# Patient Record
Sex: FEMALE | Race: WHITE | NOT HISPANIC OR LATINO | Employment: OTHER | URBAN - NONMETROPOLITAN AREA
[De-identification: names, ages, dates, MRNs, and addresses within clinical notes are randomized per-mention and may not be internally consistent; named-entity substitution may affect disease eponyms.]

---

## 2017-01-24 ENCOUNTER — OFFICE VISIT (OUTPATIENT)
Dept: FAMILY MEDICINE CLINIC | Facility: CLINIC | Age: 62
End: 2017-01-24

## 2017-01-24 VITALS
OXYGEN SATURATION: 93 % | HEIGHT: 60 IN | HEART RATE: 97 BPM | WEIGHT: 147.8 LBS | TEMPERATURE: 97.6 F | SYSTOLIC BLOOD PRESSURE: 112 MMHG | BODY MASS INDEX: 29.02 KG/M2 | DIASTOLIC BLOOD PRESSURE: 74 MMHG

## 2017-01-24 DIAGNOSIS — R05.9 COUGH: ICD-10-CM

## 2017-01-24 DIAGNOSIS — H65.112 ACUTE MUCOID OTITIS MEDIA OF LEFT EAR: ICD-10-CM

## 2017-01-24 DIAGNOSIS — J02.9 PHARYNGITIS, UNSPECIFIED ETIOLOGY: Primary | ICD-10-CM

## 2017-01-24 DIAGNOSIS — J01.40 ACUTE PANSINUSITIS, RECURRENCE NOT SPECIFIED: ICD-10-CM

## 2017-01-24 PROBLEM — K64.4 EXTERNAL HEMORRHOIDS: Status: ACTIVE | Noted: 2017-01-24

## 2017-01-24 PROBLEM — K63.5 COLON POLYP: Status: ACTIVE | Noted: 2017-01-24

## 2017-01-24 PROBLEM — Z72.0 TOBACCO USE: Status: ACTIVE | Noted: 2017-01-24

## 2017-01-24 PROCEDURE — 99213 OFFICE O/P EST LOW 20 MIN: CPT | Performed by: NURSE PRACTITIONER

## 2017-01-24 RX ORDER — CEFDINIR 300 MG/1
300 CAPSULE ORAL 2 TIMES DAILY
Qty: 20 CAPSULE | Refills: 0 | Status: SHIPPED | OUTPATIENT
Start: 2017-01-24 | End: 2017-02-03

## 2017-01-24 RX ORDER — AMOXICILLIN 875 MG/1
875 TABLET, COATED ORAL 2 TIMES DAILY
Qty: 20 TABLET | Refills: 0 | Status: SHIPPED | OUTPATIENT
Start: 2017-01-24 | End: 2017-01-24 | Stop reason: ALTCHOICE

## 2017-01-24 NOTE — PROGRESS NOTES
Subjective   Itzel Roth is a 61 y.o. female. Patient is here for sinus pressure, cough, and sore throat. This has been going on for about 5 days. Sore throat started yesterday.    History of Present Illness 61-year-old  female presents to the office today with complaints of frontal and maxillary sinus pressure and a objective clear to yellow mucousy cough with burning sore throat. This is been constant for the last 5 days. Sore throat began yesterday. Has treated with also cough drops-Advil. Nothing makes better for very long and eating and talking make worse. On a 1-10 scale rates her discomfort as 3 . Woman his given natural childbirth twice.     The following portions of the patient's history were reviewed and updated as appropriate: allergies, current medications, past family history, past medical history, past social history, past surgical history and problem list.    Review of Systems   HENT: Positive for ear pain, sinus pressure and sore throat.    Respiratory: Positive for cough.    All other systems reviewed and are negative.      Objective   Physical Exam   Constitutional: She is oriented to person, place, and time. She appears well-developed and well-nourished.   HENT:   Head: Normocephalic and atraumatic.   Left tympanic membrane red, right tympanic membrane cloudy,still has a mild cone of light. Nasal membranes red dry. Throat hamburger blood red uvula elongated with tiny ulcerated areas Thick clear to yellow gray postnasal drip.Oropharynx red. Maxillary and frontal sinus tenderness with palpation   Eyes: Conjunctivae and EOM are normal. Pupils are equal, round, and reactive to light.   Neck: Normal range of motion. Neck supple.   Cardiovascular: Normal rate and regular rhythm.    Pulmonary/Chest: Effort normal and breath sounds normal.   Abdominal: Soft. Bowel sounds are normal.   Musculoskeletal: Normal range of motion.   Lymphadenopathy:     She has no cervical adenopathy.    Neurological: She is alert and oriented to person, place, and time.   Skin: Skin is warm and dry.   Psychiatric: She has a normal mood and affect. Her behavior is normal. Judgment and thought content normal.   Nursing note and vitals reviewed.      Assessment/Plan   Itzel was seen today for cough, sore throat and sinus problem.    Diagnoses and all orders for this visit:    Pharyngitis, unspecified etiology  -     cefdinir (OMNICEF) 300 MG capsule; Take 1 capsule by mouth 2 (Two) Times a Day for 10 days.    Cough  -     Discontinue: amoxicillin (AMOXIL) 875 MG tablet; Take 1 tablet by mouth 2 (Two) Times a Day for 10 days.    Acute pansinusitis, recurrence not specified  -     Discontinue: amoxicillin (AMOXIL) 875 MG tablet; Take 1 tablet by mouth 2 (Two) Times a Day for 10 days.  -     cefdinir (OMNICEF) 300 MG capsule; Take 1 capsule by mouth 2 (Two) Times a Day for 10 days.    Acute mucoid otitis media of left ear  -     cefdinir (OMNICEF) 300 MG capsule; Take 1 capsule by mouth 2 (Two) Times a Day for 10 days.    Other orders  -     lidocaine viscous (XYLOCAINE) 2 % solution; 5 mL swish and gargle and spit as needed for throat pain every 4-6 hours    To remain indoors and well rested. Take all meds as ordered, stay well hydrated and follow-up in one week if not feeling improvement, sooner if worse. Alternate ibuprofen and Tylenol for body aches and fever. Pt will return to discuss health maintenance matters. Patient prefers Omnicef to Amoxil says it works much better for her.

## 2017-01-24 NOTE — MR AVS SNAPSHOT
Itzel Roth   1/24/2017 12:30 PM   Office Visit    Dept Phone:  [phone]   Encounter #:  [id number]    Provider:  LORELEI Bui   Department:  [facility]                Your Full Care Plan              Today's Medication Changes          These changes are accurate as of: 1/24/17  1:22 PM.  If you have any questions, ask your nurse or doctor.               New Medication(s)Ordered:     lidocaine viscous 2 % solution   Commonly known as:  XYLOCAINE   5 mL swish and gargle and spit as needed for throat pain every 4-6 hours         Stop taking medication(s)listed here:     cetirizine 10 MG tablet   Commonly known as:  zyrTEC                Where to Get Your Medications      These medications were sent to [facility] [address] - [phone]  - [phone]   [address] [place] [address]     Phone:  [phone]     cefdinir 300 MG capsule    lidocaine viscous 2 % solution                  Your Updated Medication List          This list is accurate as of: 1/24/17  1:22 PM.  Always use your most recent med list.                albuterol (2.5 MG/3ML) 0.083% nebulizer solution   Commonly known as:  PROVENTIL       budesonide-formoterol 160-4.5 MCG/ACT inhaler   Commonly known as:  SYMBICORT   Inhale 2 puffs 2 (two) times a day.       cefdinir 300 MG capsule   Commonly known as:  OMNICEF   Take 1 capsule by mouth 2 (Two) Times a Day for 10 days.       guaifenesin-dextromethorphan  MG tablet sustained-release 12 hour tablet   Take 1 tablet by mouth 2 (Two) Times a Day.       lidocaine viscous 2 % solution   Commonly known as:  XYLOCAINE   5 mL swish and gargle and spit as needed for throat pain every 4-6 hours       Nebulizer/Tubing/Mouthpiece kit       tiotropium 18 MCG per inhalation capsule   Commonly known as:  SPIRIVA HANDIHALER   Place 1 caps into inhaler and take 2 puffs  "              You Were Diagnosed With        Codes Comments    Pharyngitis, unspecified etiology    -  Primary ICD-10-CM: J02.9  ICD-9-CM: 462     Cough     ICD-10-CM: R05  ICD-9-CM: 786.2     Acute pansinusitis, recurrence not specified     ICD-10-CM: J01.40  ICD-9-CM: 461.8     Acute mucoid otitis media of left ear     ICD-10-CM: H65.112  ICD-9-CM: 381.02       Medications to be Given to You by a Medical Professional     Due       Frequency    (none) albuterol (PROVENTIL) nebulizer solution 2.5 mg  Once      Instructions     None    Patient Instructions History      Upcoming Appointments     Visit Type Date Time Department    OFFICE VISIT 2017 12:30 PM [facility] Signup     [facility] allows you to send messages to your doctor, view your test results, renew your prescriptions, schedule appointments, and more. To sign up, go to [employer] and click on the Sign Up Now link in the New User? box. Enter your [facility] Activation Code exactly as it appears below along with the last four digits of your Social Security Number and your Date of Birth () to complete the sign-up process. If you do not sign up before the expiration date, you must request a new code.    [facility] Activation Code: [email or web address]  Expires: 2017  5:36 AM    If you have questions, you can email [email or web address] or call [phone] to talk to our [facility] staff. Remember, [employer] is NOT to be used for urgent needs. For medical emergencies, dial 911.               Other Info from Your Visit           Allergies     Morphine        Reason for Visit     Cough     Sore Throat     Sinus Problem           Vital Signs     Blood Pressure Pulse Temperature Height Weight Oxygen Saturation    112/74 (BP Location: Left arm) 97 97.6 °F (36.4 °C) (Oral) 60\" (152.4 cm) 147 lb 12.8 oz (67 kg) 93%    Breastfeeding? Body Mass Index Smoking Status             No 28.87 kg/m2 Current Every Day Smoker "         Problems and Diagnoses Noted     Acute mucoid otitis media of left ear    Acute sinus infection    Arthritis    Chronic airway obstruction    Colon polyp    Cough    External hemorrhoids    Inflammation of throat    Tobacco use

## 2017-02-17 ENCOUNTER — OFFICE VISIT (OUTPATIENT)
Dept: FAMILY MEDICINE CLINIC | Facility: CLINIC | Age: 62
End: 2017-02-17

## 2017-02-17 VITALS
HEIGHT: 60 IN | SYSTOLIC BLOOD PRESSURE: 116 MMHG | OXYGEN SATURATION: 95 % | BODY MASS INDEX: 29.25 KG/M2 | WEIGHT: 149 LBS | HEART RATE: 70 BPM | DIASTOLIC BLOOD PRESSURE: 82 MMHG | TEMPERATURE: 98.2 F

## 2017-02-17 DIAGNOSIS — J01.40 SUBACUTE PANSINUSITIS: ICD-10-CM

## 2017-02-17 DIAGNOSIS — R09.81 SINUS CONGESTION: ICD-10-CM

## 2017-02-17 DIAGNOSIS — H92.03 OTALGIA, BILATERAL: ICD-10-CM

## 2017-02-17 DIAGNOSIS — M62.838 MUSCLE SPASM: ICD-10-CM

## 2017-02-17 DIAGNOSIS — R05.8 DRY COUGH: Primary | ICD-10-CM

## 2017-02-17 DIAGNOSIS — Z00.00 HEALTHCARE MAINTENANCE: ICD-10-CM

## 2017-02-17 PROBLEM — H92.09 OTALGIA: Status: ACTIVE | Noted: 2017-02-17

## 2017-02-17 PROCEDURE — 99214 OFFICE O/P EST MOD 30 MIN: CPT | Performed by: NURSE PRACTITIONER

## 2017-02-17 RX ORDER — FLUTICASONE PROPIONATE 50 MCG
2 SPRAY, SUSPENSION (ML) NASAL DAILY
Qty: 16 G | Refills: 3 | Status: SHIPPED | OUTPATIENT
Start: 2017-02-17 | End: 2017-03-19

## 2017-02-17 RX ORDER — BACLOFEN 10 MG/1
10 TABLET ORAL 3 TIMES DAILY
Qty: 90 TABLET | Refills: 0 | Status: SHIPPED | OUTPATIENT
Start: 2017-02-17 | End: 2017-06-06

## 2017-02-17 RX ORDER — AMOXICILLIN 875 MG/1
875 TABLET, COATED ORAL 2 TIMES DAILY
Qty: 20 TABLET | Refills: 0 | Status: SHIPPED | OUTPATIENT
Start: 2017-02-17 | End: 2017-02-27

## 2017-02-17 RX ORDER — BENZONATATE 200 MG/1
200 CAPSULE ORAL 3 TIMES DAILY PRN
Qty: 90 CAPSULE | Refills: 0 | Status: SHIPPED | OUTPATIENT
Start: 2017-02-17 | End: 2017-06-06

## 2017-02-17 NOTE — PROGRESS NOTES
Subjective   Itzel Roth is a 61 y.o. female. Patient is here for sinus issues and muscle spasms. She states she was in a month ago and now has came back with earache, nasal congestion, and coughing. The muscle spasms been going on for a few years. When it started it was in her back and when she laid down she would feel it. Here recently it has gotten worse. It is in her left leg, foot, and hand. The spasms have been going into her torso.     History of Present Illness 61-year-old  female presents to the office today with complaints of Frontal and Maxillary sinus issues and muscle spasms. States this began slowly approximately a month ago and has weaned and waned and is now back with complaints of  Bilateral pressure like earache, nasal congestion and dry hacking coughing. Has treated with Nyquil, Spiriva, Symbicort and DM. States that nothing better and  Nothing makes worse. Rates her discomfort on a 1-10 scale as 2-4 .Pt continues to smoke 1 1/2 pack/day x 50 yrs.     Also has muscle spasms that have been present intermittently over the last few years. When it starts she is on her back laying down and she would feel them in her left leg foot and hand, but now moving into her torso. Recently it has gotten worse. Has treated with potassium rich foods, increased water intake 70 ozs a day and Epsom salts bath. States that nothing makes better and  nothing makes worse. On a 1-10 scale rates her spasms as 8-9.    The following portions of the patient's history were reviewed and updated as appropriate: allergies, current medications, past family history, past medical history, past social history, past surgical history and problem list.    Review of Systems   HENT: Positive for congestion and ear pain.    Respiratory: Positive for cough.    Musculoskeletal:        Muscle Spasms.   All other systems reviewed and are negative.      Objective   Physical Exam   Constitutional: She is oriented to person, place, and  time. She appears well-developed and well-nourished.   HENT:   Head: Normocephalic and atraumatic.   Right tympanic membrane has blood red streak running across the center. Left tympanic membrane is full and hazy. Nasal membranes red and swollen mucus present green gray. Throat very red thick clear mucus with yellow tinges dripping down the back of throat.   Eyes: Conjunctivae and EOM are normal. Pupils are equal, round, and reactive to light.   Neck: Normal range of motion.   Cardiovascular: Normal rate, regular rhythm and normal heart sounds.    Pulmonary/Chest: Effort normal and breath sounds normal.   Abdominal: Soft. Bowel sounds are normal.   Musculoskeletal:   Patient actively having leg cramps during visit required massage of lower left leg   Lymphadenopathy:     She has no cervical adenopathy.   Neurological: She is alert and oriented to person, place, and time.   Skin: Skin is warm and dry.   Psychiatric: She has a normal mood and affect. Her behavior is normal. Judgment and thought content normal.   Nursing note and vitals reviewed.      Assessment/Plan   Itzel was seen today for sinus issues and spasms.    Diagnoses and all orders for this visit:    Dry cough  -     CBC & Differential  -     Comprehensive Metabolic Panel  -     Lipid Panel  -     TSH  -     Cancel: Magnesium; Future  -     benzonatate (TESSALON) 200 MG capsule; Take 1 capsule by mouth 3 (Three) Times a Day As Needed for cough.    Sinus congestion  -     CBC & Differential  -     Comprehensive Metabolic Panel  -     Lipid Panel  -     TSH  -     Cancel: Magnesium; Future  -     Chlorcyclizine-Pseudoephed 25-60 MG tablet; Take 1 tablet by mouth 2 (Two) Times a Day for 21 days.    Otalgia, bilateral  -     CBC & Differential  -     Comprehensive Metabolic Panel  -     Lipid Panel  -     TSH  -     Cancel: Magnesium; Future  -     amoxicillin (AMOXIL) 875 MG tablet; Take 1 tablet by mouth 2 (Two) Times a Day for 10 days.    Muscle spasm  -      CBC & Differential  -     Comprehensive Metabolic Panel  -     Lipid Panel  -     TSH  -     Cancel: Magnesium; Future  -     baclofen (LIORESAL) 10 MG tablet; Take 1 tablet by mouth 3 (Three) Times a Day.  -     Magnesium    Subacute pansinusitis  -     amoxicillin (AMOXIL) 875 MG tablet; Take 1 tablet by mouth 2 (Two) Times a Day for 10 days.    Healthcare maintenance  -     Hepatitis C Antibody    Other orders  -     fluticasone (FLONASE) 50 MCG/ACT nasal spray; 2 sprays into each nostril Daily for 30 days.      Will treat her subacute pansinusitis with amoxicillin 875 mg twice a day for 10 full days even if feeling better after 5. Will treat the drainage with Stahist twice a day until relief is felt. Will treat the nasal congestion with Flonase until relieved. Will treat muscle spasm with baclofen 10 mg 3 times a day as needed. Risk and benefits discussed with patient with regard to muscle relaxant. She will not operate heavy machinery, make any decisions that her life altering or drive a car until she knows how she feels with the medication on board. Labs drawn today will be called once resulted. Hopefully we will find out the reason for her muscle spasms. She will continue to remain well hydrated, take warm showers stretching daily. Will follow-up in one week if not feeling better sooner if worse. On next visit will discuss pneumococcal vaccine, tetanus vaccine, mammogram Pap smear and influenza vaccine.

## 2017-02-18 LAB
ALBUMIN SERPL-MCNC: 4.6 G/DL (ref 3.6–4.8)
ALBUMIN/GLOB SERPL: 2.1 {RATIO} (ref 1.1–2.5)
ALP SERPL-CCNC: 66 IU/L (ref 39–117)
ALT SERPL-CCNC: 14 IU/L (ref 0–32)
AST SERPL-CCNC: 17 IU/L (ref 0–40)
BASOPHILS # BLD AUTO: 0 X10E3/UL (ref 0–0.2)
BASOPHILS NFR BLD AUTO: 1 %
BILIRUB SERPL-MCNC: <0.2 MG/DL (ref 0–1.2)
BUN SERPL-MCNC: 10 MG/DL (ref 8–27)
BUN/CREAT SERPL: 14 (ref 11–26)
CALCIUM SERPL-MCNC: 8.9 MG/DL (ref 8.7–10.3)
CHLORIDE SERPL-SCNC: 100 MMOL/L (ref 96–106)
CHOLEST SERPL-MCNC: 195 MG/DL (ref 100–199)
CO2 SERPL-SCNC: 24 MMOL/L (ref 18–29)
CREAT SERPL-MCNC: 0.72 MG/DL (ref 0.57–1)
EOSINOPHIL # BLD AUTO: 0.2 X10E3/UL (ref 0–0.4)
EOSINOPHIL NFR BLD AUTO: 2 %
ERYTHROCYTE [DISTWIDTH] IN BLOOD BY AUTOMATED COUNT: 13.3 % (ref 12.3–15.4)
GLOBULIN SER CALC-MCNC: 2.2 G/DL (ref 1.5–4.5)
GLUCOSE SERPL-MCNC: 90 MG/DL (ref 65–99)
HCT VFR BLD AUTO: 41.1 % (ref 34–46.6)
HCV AB S/CO SERPL IA: <0.1 S/CO RATIO (ref 0–0.9)
HDLC SERPL-MCNC: 63 MG/DL
HGB BLD-MCNC: 13.8 G/DL (ref 11.1–15.9)
IMM GRANULOCYTES # BLD: 0 X10E3/UL (ref 0–0.1)
IMM GRANULOCYTES NFR BLD: 0 %
LDLC SERPL CALC-MCNC: 108 MG/DL (ref 0–99)
LYMPHOCYTES # BLD AUTO: 2.4 X10E3/UL (ref 0.7–3.1)
LYMPHOCYTES NFR BLD AUTO: 37 %
MAGNESIUM SERPL-MCNC: 2.1 MG/DL (ref 1.6–2.3)
MCH RBC QN AUTO: 31.7 PG (ref 26.6–33)
MCHC RBC AUTO-ENTMCNC: 33.6 G/DL (ref 31.5–35.7)
MCV RBC AUTO: 95 FL (ref 79–97)
MONOCYTES # BLD AUTO: 0.5 X10E3/UL (ref 0.1–0.9)
MONOCYTES NFR BLD AUTO: 8 %
NEUTROPHILS # BLD AUTO: 3.4 X10E3/UL (ref 1.4–7)
NEUTROPHILS NFR BLD AUTO: 52 %
PLATELET # BLD AUTO: 266 X10E3/UL (ref 150–379)
POTASSIUM SERPL-SCNC: 4.1 MMOL/L (ref 3.5–5.2)
PROT SERPL-MCNC: 6.8 G/DL (ref 6–8.5)
RBC # BLD AUTO: 4.35 X10E6/UL (ref 3.77–5.28)
SODIUM SERPL-SCNC: 142 MMOL/L (ref 134–144)
TRIGL SERPL-MCNC: 122 MG/DL (ref 0–149)
TSH SERPL DL<=0.005 MIU/L-ACNC: 1.01 UIU/ML (ref 0.45–4.5)
VLDLC SERPL CALC-MCNC: 24 MG/DL (ref 5–40)
WBC # BLD AUTO: 6.6 X10E3/UL (ref 3.4–10.8)

## 2017-02-20 ENCOUNTER — DOCUMENTATION (OUTPATIENT)
Dept: FAMILY MEDICINE CLINIC | Facility: CLINIC | Age: 62
End: 2017-02-20

## 2017-02-20 NOTE — PROGRESS NOTES
Patient declines sleep study and neurology. Wishes to try physical therapy on her own for her muscle cramps.

## 2017-06-06 ENCOUNTER — OFFICE VISIT (OUTPATIENT)
Dept: FAMILY MEDICINE CLINIC | Facility: CLINIC | Age: 62
End: 2017-06-06

## 2017-06-06 VITALS
DIASTOLIC BLOOD PRESSURE: 70 MMHG | BODY MASS INDEX: 29.17 KG/M2 | HEART RATE: 72 BPM | HEIGHT: 60 IN | OXYGEN SATURATION: 97 % | TEMPERATURE: 97.7 F | WEIGHT: 148.6 LBS | SYSTOLIC BLOOD PRESSURE: 132 MMHG

## 2017-06-06 DIAGNOSIS — R14.0 FLATULENCE/GAS PAIN/BELCHING: ICD-10-CM

## 2017-06-06 DIAGNOSIS — Z78.0 POSTMENOPAUSAL: ICD-10-CM

## 2017-06-06 DIAGNOSIS — Z00.00 HEALTHCARE MAINTENANCE: Primary | ICD-10-CM

## 2017-06-06 DIAGNOSIS — R14.0 POSTPRANDIAL ABDOMINAL BLOATING: ICD-10-CM

## 2017-06-06 PROBLEM — K59.1 FUNCTIONAL DIARRHEA: Status: ACTIVE | Noted: 2017-06-06

## 2017-06-06 PROCEDURE — 90471 IMMUNIZATION ADMIN: CPT | Performed by: NURSE PRACTITIONER

## 2017-06-06 PROCEDURE — 90670 PCV13 VACCINE IM: CPT | Performed by: NURSE PRACTITIONER

## 2017-06-06 PROCEDURE — 99214 OFFICE O/P EST MOD 30 MIN: CPT | Performed by: NURSE PRACTITIONER

## 2017-06-06 RX ORDER — OMEPRAZOLE 40 MG/1
40 CAPSULE, DELAYED RELEASE ORAL DAILY
Qty: 30 CAPSULE | Refills: 3 | Status: SHIPPED | OUTPATIENT
Start: 2017-06-06 | End: 2018-01-26 | Stop reason: SDUPTHER

## 2017-06-06 NOTE — PROGRESS NOTES
Subjective   Itzel Roth is a 62 y.o. female. Patient is being seen today for bloating, belching, abdomin discomfort, and diarrhea. She states that she was treated for H Pylori about 4-5 years ago and these are the same symptoms she has had previously. These symptoms have been going on for a couple months to a year. She has been trying different things to help with this and brought all of the medicines she has tried to help.  History of Present Illness 62 yr old white female here for unexplained abdominal bloating, belching, discomfort and at times diarrhea. Must evacuate bowels several times in the morning before going about her daily business. Has been tested and treated for H. pylori 5 years ago. Current symptoms have been going on for a couple of months to a year. Uses over-the-counter GI cleanse, multivitamins, herbal supplements nothing helps. Last colonoscopy for 5 years ago.  said she was good for 10 years.    The following portions of the patient's history were reviewed and updated as appropriate: allergies, current medications, past family history, past medical history, past social history, past surgical history and problem list.    Review of Systems   Gastrointestinal: Positive for abdominal pain and diarrhea.        Abdomin discomfort.  Belching.    All other systems reviewed and are negative.      Objective   Physical Exam   Constitutional: She is oriented to person, place, and time. She appears well-developed and well-nourished.   HENT:   Head: Normocephalic and atraumatic.   Right Ear: External ear normal.   Left Ear: External ear normal.   Mouth/Throat: Oropharynx is clear and moist.   Eyes: EOM are normal. Pupils are equal, round, and reactive to light.   Neck: Normal range of motion. Neck supple.   Cardiovascular: Normal rate, regular rhythm and normal heart sounds.    Pulmonary/Chest: Effort normal and breath sounds normal.   Abdominal: Soft. She exhibits distension.   Softly distended,  Hallow tunnellike bowel sounds. Patient has several deep belches during visit. States she no longer enjoys eating. Only sips fluid most of the time.   Musculoskeletal: Normal range of motion.   Neurological: She is alert and oriented to person, place, and time.   Skin: Skin is warm and dry.   Psychiatric: She has a normal mood and affect. Her behavior is normal. Judgment and thought content normal.   Nursing note and vitals reviewed.      Assessment/Plan   Itzel was seen today for bloated, belching, abdominal discomfort and diarrhea.    Diagnoses and all orders for this visit:    Healthcare maintenance  -     Pneumococcal Conjugate Vaccine 13-Valent All  -     Mammo Screening Bilateral With CAD; Future  -     DEXA Bone Density Axial; Future  -     DEXA Bone Density Axial  -     Mammo Screening Bilateral With CAD    Postmenopausal  -     Mammo Screening Bilateral With CAD; Future  -     DEXA Bone Density Axial; Future  -     DEXA Bone Density Axial  -     Mammo Screening Bilateral With CAD    Postprandial abdominal bloating  -     Ambulatory Referral to Gastroenterology  -     omeprazole (PRILOSEC) 40 MG capsule; Take 1 capsule by mouth Daily.    Flatulence/gas pain/belching  -     omeprazole (PRILOSEC) 40 MG capsule; Take 1 capsule by mouth Daily.    Referring to gastroenterology for upper and lower GI. Hopefully this will determine the cause of her abdominal distention, belching and diarrhea. Will see if omeprazole 40 mg per day will help with some of the discomfort. Addressed health maintenance issues scheduling for bone density testing and mammogram, given Prevnar 13 in office. Since patient has had a total hysterectomy and is monogamous with the same partner. No longer has Pap smears. All health care issues have now been addressed.Will further developed plan of care based on findings of upper and lower GI.

## 2017-06-12 ENCOUNTER — HOSPITAL ENCOUNTER (OUTPATIENT)
Dept: MAMMOGRAPHY | Facility: HOSPITAL | Age: 62
Discharge: HOME | End: 2017-06-12
Admitting: NURSE PRACTITIONER

## 2017-06-12 ENCOUNTER — HOSPITAL ENCOUNTER (OUTPATIENT)
Dept: BONE DENSITY | Facility: HOSPITAL | Age: 62
Discharge: HOME | End: 2017-06-12

## 2017-06-12 PROCEDURE — 77080 DXA BONE DENSITY AXIAL: CPT

## 2017-06-12 PROCEDURE — G0202 SCR MAMMO BI INCL CAD: HCPCS

## 2017-06-14 ENCOUNTER — TELEPHONE (OUTPATIENT)
Dept: FAMILY MEDICINE CLINIC | Facility: CLINIC | Age: 62
End: 2017-06-14

## 2017-06-14 NOTE — TELEPHONE ENCOUNTER
Patient called and said someone had called her yesterday, she was returning the call. Her number is [phone]

## 2017-07-07 ENCOUNTER — OFFICE VISIT (OUTPATIENT)
Dept: GASTROENTEROLOGY | Facility: CLINIC | Age: 62
End: 2017-07-07

## 2017-07-07 VITALS
DIASTOLIC BLOOD PRESSURE: 80 MMHG | HEIGHT: 60 IN | BODY MASS INDEX: 28.7 KG/M2 | WEIGHT: 146.2 LBS | TEMPERATURE: 98.2 F | SYSTOLIC BLOOD PRESSURE: 124 MMHG

## 2017-07-07 DIAGNOSIS — R19.7 DIARRHEA, UNSPECIFIED TYPE: ICD-10-CM

## 2017-07-07 DIAGNOSIS — R14.0 POSTPRANDIAL ABDOMINAL BLOATING: Primary | ICD-10-CM

## 2017-07-07 DIAGNOSIS — R14.0 FLATULENCE/GAS PAIN/BELCHING: ICD-10-CM

## 2017-07-07 PROCEDURE — 99204 OFFICE O/P NEW MOD 45 MIN: CPT | Performed by: INTERNAL MEDICINE

## 2017-07-07 RX ORDER — SODIUM CHLORIDE, SODIUM LACTATE, POTASSIUM CHLORIDE, CALCIUM CHLORIDE 600; 310; 30; 20 MG/100ML; MG/100ML; MG/100ML; MG/100ML
30 INJECTION, SOLUTION INTRAVENOUS CONTINUOUS
Status: CANCELLED | OUTPATIENT
Start: 2017-07-07

## 2017-07-07 RX ORDER — SODIUM CHLORIDE 0.9 % (FLUSH) 0.9 %
1-10 SYRINGE (ML) INJECTION AS NEEDED
Status: CANCELLED | OUTPATIENT
Start: 2017-07-07

## 2017-07-07 NOTE — PROGRESS NOTES
Chief Complaint   Patient presents with   • Bloated     Subjective      HPI     Itzel Roth is a 62 y.o. female who presents for evaluation of abdominal bloating.  She reports significant post-prandial fullness.  She has frequent belching associated with these symptoms.  Symptoms can occur with even minimal food intake.  She also reports intermittent episodes of diarrhea - typically these occur in morning, with few BMs in rapid succession, then relatively normal BMs for remainder of day.  Symptoms have been present for last 3-4 years.  She reports prior work about 5 years ago for similar symptoms which she recall as being negative.  Weight has been relatively stable, if not somewhat increased.  She has tried various types of Gi cleanses and enzymes/probiotics with minimal success.  She reports sister with celiac disease.  Mother had gastric cancer.      History reviewed. No pertinent past medical history.    Current Outpatient Prescriptions:   •  budesonide-formoterol (SYMBICORT) 160-4.5 MCG/ACT inhaler, Inhale 2 puffs 2 (two) times a day., Disp: 10.2 g, Rfl: 6  •  esomeprazole (nexIUM) 20 MG capsule, Take 20 mg by mouth Every Morning Before Breakfast., Disp: , Rfl:   •  guaifenesin-dextromethorphan (MUCINEX DM)  MG tablet sustained-release 12 hour tablet, Take 1 tablet by mouth 2 (Two) Times a Day., Disp: 45 tablet, Rfl: 0  •  omeprazole (PRILOSEC) 40 MG capsule, Take 1 capsule by mouth Daily., Disp: 30 capsule, Rfl: 3    Current Facility-Administered Medications:   •  albuterol (PROVENTIL) nebulizer solution 2.5 mg, 2.5 mg, Nebulization, Once, LORELEI Bui     Allergies   Allergen Reactions   • Morphine      Social History     Social History   • Marital status:      Spouse name: N/A   • Number of children: N/A   • Years of education: N/A     Occupational History   • Not on file.     Social History Main Topics   • Smoking status: Current Every Day Smoker   • Smokeless tobacco:  Never Used   • Alcohol use 4.2 oz/week     7 Glasses of wine per week   • Drug use: No   • Sexual activity: Not on file     Other Topics Concern   • Not on file     Social History Narrative     Family History   Problem Relation Age of Onset   • Cancer Mother      lymphocercoma   • Breast cancer Maternal Grandmother 104     Review of Systems   Gastrointestinal: Positive for abdominal distention, abdominal pain and diarrhea.        Bloating   All other systems reviewed and are negative.       Objective      Vitals:    07/07/17 1014   BP: 124/80   Temp: 98.2 °F (36.8 °C)     Physical Exam   Constitutional: She is oriented to person, place, and time. She appears well-developed and well-nourished.   HENT:   Head: Normocephalic and atraumatic.   Mouth/Throat: Oropharynx is clear and moist.   Eyes: EOM are normal. No scleral icterus.   Neck: Normal range of motion. Neck supple. No thyromegaly present.   Cardiovascular: Normal rate, regular rhythm and normal heart sounds.  Exam reveals no gallop and no friction rub.    No murmur heard.  Pulmonary/Chest: Effort normal and breath sounds normal. She has no wheezes. She has no rales. She exhibits no tenderness.   Abdominal: Soft. Bowel sounds are normal. She exhibits no distension. There is tenderness. There is no rebound and no guarding. No hernia.   Mild epigastric TTP w/o reboung/guarding   Musculoskeletal: Normal range of motion. She exhibits no edema.   Lymphadenopathy:     She has no cervical adenopathy.   Neurological: She is alert and oriented to person, place, and time.   Skin: Skin is warm and dry.   Psychiatric: She has a normal mood and affect. Judgment and thought content normal.   Vitals reviewed.       Assessment/Plan   Assessment:     1. Postprandial abdominal bloating    2. Flatulence/gas pain/belching    3. Diarrhea, unspecified type        Plan:   Recommend trail of low FODMAP diet - handout provided  As needed FDgard  Schedule EGD and colonoscopy    If  endoscopic evaluation negative consider trail of Kristen Ornelas M.D.  [facility]  [address] [place] [address]  Office: [phone]

## 2018-01-26 ENCOUNTER — OFFICE VISIT (OUTPATIENT)
Dept: FAMILY MEDICINE CLINIC | Facility: CLINIC | Age: 63
End: 2018-01-26

## 2018-01-26 VITALS
WEIGHT: 150 LBS | SYSTOLIC BLOOD PRESSURE: 134 MMHG | OXYGEN SATURATION: 97 % | BODY MASS INDEX: 29.45 KG/M2 | TEMPERATURE: 97.2 F | DIASTOLIC BLOOD PRESSURE: 74 MMHG | HEART RATE: 73 BPM | HEIGHT: 60 IN

## 2018-01-26 DIAGNOSIS — R10.84 GENERALIZED ABDOMINAL PAIN: ICD-10-CM

## 2018-01-26 DIAGNOSIS — R14.0 BLOATING SYMPTOM: Primary | ICD-10-CM

## 2018-01-26 PROCEDURE — 99213 OFFICE O/P EST LOW 20 MIN: CPT | Performed by: NURSE PRACTITIONER

## 2018-01-26 RX ORDER — LANSOPRAZOLE, AMOXICILLIN, CLARITHROMYCIN 30-500-500
KIT ORAL 2 TIMES DAILY
Qty: 1 KIT | Refills: 0 | Status: SHIPPED | OUTPATIENT
Start: 2018-01-26 | End: 2019-01-04

## 2018-01-26 NOTE — PROGRESS NOTES
Subjective   Itzel Roth is a 62 y.o. female. Patient here today for pain and bloating in abdominal area.  Patient thinks possible H. Pylori.    History of Present Illness 62-year-old  female here today for sudden onset of abdominal bloating and pain in the entire abdomen, which has been intermittent for approximately several years. Patient thinks she may have H. pylori. She is being treated for GERD with [id number] mg capsule per day. Not really helping much. Also has COPD for which she takes Symbicort, Mucinex DM and uses a Proventil rescue inhaler    The following portions of the patient's history were reviewed and updated as appropriate: allergies, current medications, past family history, past medical history, past social history, past surgical history and problem list.    Review of Systems   Respiratory:        Being treated for COPD   Gastrointestinal: Positive for abdominal pain and diarrhea.        Abdominal bloating  Abnormal amount of belching.    All other systems reviewed and are negative.      Objective   Physical Exam   Constitutional: She is oriented to person, place, and time. She appears well-developed and well-nourished.   HENT:   Head: Normocephalic and atraumatic.   Eyes: EOM are normal. Pupils are equal, round, and reactive to light.   Neck: Normal range of motion. Neck supple.   Cardiovascular: Normal rate and regular rhythm.    Pulmonary/Chest: Effort normal and breath sounds normal.   Abdominal: Soft. Bowel sounds are normal. She exhibits distension. She exhibits no mass. There is tenderness. There is no rebound and no guarding. No hernia.   Abdomin distention patient appears to be about 6 months gravid. Has hyperactive bowel sounds in all 4 quadrants abdomen is tender with palpation due to extensive bloating. Surprisingly abdomen remain soft   Musculoskeletal: Normal range of motion.   Neurological: She is alert and oriented to person, place, and time.   Skin: Skin is warm and  dry.   Psychiatric: She has a normal mood and affect. Her behavior is normal. Judgment and thought content normal.   Nursing note and vitals reviewed.      Assessment/Plan   Itzel was seen today for abdominal pain and bloated.    Diagnoses and all orders for this visit:    Bloating symptom  -     H. Pylori IgM, Blood  -     amoxicillin-clarithromycin-lansoprazole (PREVPAC) combo pack; Take  by mouth 2 (Two) Times a Day. Follow package directions.  -     esomeprazole (nexIUM) 20 MG capsule; Take 1 capsule by mouth Every Morning Before Breakfast.    Generalized abdominal pain  -     H. Pylori IgM, Blood  -     amoxicillin-clarithromycin-lansoprazole (PREVPAC) combo pack; Take  by mouth 2 (Two) Times a Day. Follow package directions.  -     esomeprazole (nexIUM) 20 MG capsule; Take 1 capsule by mouth Every Morning Before Breakfast.    Will draw blood to check for H. pylori IgM. Will treat symptoms with Prevpac. Have reordered her Nexium to be started once she has completed the pack. If symptoms do not resolve will send to GI. Patient will eat a bland diet. Sipping fluids as able. Will follow-up next week if not improving and will go to the emergency room if symptoms worsen or her abdomen becomes tense. Patient verbalizes understanding of orders.

## 2018-01-29 LAB — H PYLORI IGM SER-ACNC: <9 UNITS (ref 0–8.9)

## 2018-02-06 ENCOUNTER — TELEPHONE (OUTPATIENT)
Dept: FAMILY MEDICINE CLINIC | Facility: CLINIC | Age: 63
End: 2018-02-06

## 2018-02-06 DIAGNOSIS — R10.84 GENERALIZED ABDOMINAL PAIN: Primary | ICD-10-CM

## 2018-02-06 NOTE — TELEPHONE ENCOUNTER
Patient called in and stated that she is still having a lot of pain in her stomach.  Thinks she may need to see gastro.  Was doing better for about 3 days but now worse.  Would like to speak with Aida.

## 2018-02-06 NOTE — TELEPHONE ENCOUNTER
His let patient know that I put in a referral for Gastro. I am on call today and the phones ringing off work. Can she tell you what ever it is I need to know and then I will address it as soon as I can? Tell her thank you for understanding

## 2018-02-06 NOTE — TELEPHONE ENCOUNTER
PT WAS GIVEN MEDS. PHARMACY ONLY GAVE HER 6 DAYS OF NEXIUM. SHE WAS TOOK FOR 6 DAYS AND FELT BETTER BUT WAS OFF FOR 4 DAYS AND STARTED TO GET WORSE. NOT SURE WHY PHARMACY DID NOT FILL FULL RX. SHE CAN NOW HARDLY EAT ANYTHING WITHOUT THE PAIN. SHE IS EATING SMALL THINGS SUCH AS CRACKERS OR SMOOTHIES OR SOMETHING SIMILAR. WHEN SHE EATS SHE HAS TO TAKE THE MEDICINE TO HELP BUT IS %. THE PAIN IS OFF AND ON AND SHE CANNOT FIND ANYTHING CONSISTENT WITH IT. SHE IS NOT SURE IF WHERE SHE WAS OFF THE MED FOR 4 DAYS MADE IT WORSE OR WHAT? IF YOU COULD PLEASE CALL PT. SHE IS UNSURE OF WHAT SHE NEEDS TO DO.

## 2018-02-13 ENCOUNTER — OFFICE VISIT (OUTPATIENT)
Dept: GASTROENTEROLOGY | Facility: CLINIC | Age: 63
End: 2018-02-13

## 2018-02-13 VITALS
WEIGHT: 147.8 LBS | TEMPERATURE: 98.1 F | DIASTOLIC BLOOD PRESSURE: 76 MMHG | SYSTOLIC BLOOD PRESSURE: 124 MMHG | BODY MASS INDEX: 29.02 KG/M2 | HEIGHT: 60 IN

## 2018-02-13 DIAGNOSIS — Z72.0 TOBACCO ABUSE: ICD-10-CM

## 2018-02-13 DIAGNOSIS — Z86.19 HISTORY OF HELICOBACTER PYLORI INFECTION: ICD-10-CM

## 2018-02-13 DIAGNOSIS — R19.8 ALTERNATING CONSTIPATION AND DIARRHEA: ICD-10-CM

## 2018-02-13 DIAGNOSIS — K21.9 GASTROESOPHAGEAL REFLUX DISEASE, ESOPHAGITIS PRESENCE NOT SPECIFIED: ICD-10-CM

## 2018-02-13 DIAGNOSIS — R10.13 EPIGASTRIC PAIN: ICD-10-CM

## 2018-02-13 DIAGNOSIS — R10.84 GENERALIZED ABDOMINAL PAIN: Primary | ICD-10-CM

## 2018-02-13 PROCEDURE — 99214 OFFICE O/P EST MOD 30 MIN: CPT | Performed by: NURSE PRACTITIONER

## 2018-02-13 NOTE — PROGRESS NOTES
"Chief Complaint   Patient presents with   • Abdominal Pain         HPI    Pt is being seen today for A follow-up.  She was last seen by Dr. Ornelas in 2017 for complaints of abdominal pain and bloating.  She also complained of alternating constipation and diarrhea with fecal urgency.  Dr. Ornelas  ordered an EGD and colonoscopy but she did not follow up for endoscopic evaluation.  She reports a history of H. pylori.  She was recently seen by her PCP and had a positive serum H. pylori test and finishes Prevpac tomorrow. Symptoms have not improved with treatment    She continues to have generalized abdominal pain.  Upper abdominal pain is described as a burning sensation with abdominal bloating and upper abdominal fullness.  Associated symptoms include nausea, excessive belching, and postprandial fullness.  She also has lower abdominal cramping, excess flatus, and alternating bowel habits.  She will have diarrhea-several soft small BMs in rapid succession occurring in the morning associated with fecal urgency.  She also has several days of constipation or hard stools.  She states she cannot eat anything without having some sort of unwanted GI symptom.  + Generalized abdominal discomfort, abdominal bloating.   Weight is stable, she denies fever, chills, lack stool or blood in her stool.  She denies nocturnal awakenings.   Sister has celiac disease but ports negative testing.  She states she has an allergy to wheat but \"cheats\" on occasion. 2 pack per day smoker. States mom  from hpylori but  gastric cancer in her mother is listed in history. ETOH several times per week.     She is hesitant to undergo EGD and colonoscopy stating that endoscopic evaluation performed 6 years ago was negative.  Records are not available    History reviewed. No pertinent past medical history.  Past Surgical History:   Procedure Laterality Date   • APPENDECTOMY     • FOOT SURGERY      both feet   • HYSTERECTOMY     • OOPHORECTOMY  "    • SHOULDER SURGERY      both shoulders   • WISDOM TOOTH EXTRACTION         Current Outpatient Prescriptions   Medication Sig Dispense Refill   • amoxicillin-clarithromycin-lansoprazole (PREVPAC) combo pack Take  by mouth 2 (Two) Times a Day. Follow package directions. 1 kit 0   • budesonide-formoterol (SYMBICORT) 160-4.5 MCG/ACT inhaler Inhale 2 puffs 2 (two) times a day. 10.2 g 6   • CALCIUM PO Take  by mouth.     • Cholecalciferol (VITAMIN D-3 PO) Take  by mouth.     • guaifenesin-dextromethorphan (MUCINEX DM)  MG tablet sustained-release 12 hour tablet Take 1 tablet by mouth 2 (Two) Times a Day. 45 tablet 0   • NON FORMULARY VitalBiome     • NON FORMULARY Goodzymes       Current Facility-Administered Medications   Medication Dose Route Frequency Provider Last Rate Last Dose   • albuterol (PROVENTIL) nebulizer solution 2.5 mg  2.5 mg Nebulization Once [place] LORELEI Hernandez           PRN Meds:.    Allergies   Allergen Reactions   • Morphine        Social History     Social History   • Marital status:      Spouse name: N/A   • Number of children: N/A   • Years of education: N/A     Occupational History   • Not on file.     Social History Main Topics   • Smoking status: Current Every Day Smoker   • Smokeless tobacco: Never Used   • Alcohol use 4.2 oz/week     7 Glasses of wine per week   • Drug use: No   • Sexual activity: Not on file     Other Topics Concern   • Not on file     Social History Narrative       Family History   Problem Relation Age of Onset   • Cancer Mother      lymphocercoma   • Breast cancer Maternal Grandmother 104       Review of Systems   Constitutional: Negative for appetite change, chills, diaphoresis, fatigue, fever and unexpected weight change.   HENT: Negative for trouble swallowing.    Respiratory: Negative for choking and shortness of breath.    Cardiovascular: Negative for chest pain.   Gastrointestinal: Positive for abdominal distention, abdominal pain,  "constipation and nausea. Negative for blood in stool, diarrhea and vomiting.   Musculoskeletal: Negative for back pain.   Skin: Negative for color change.   Allergic/Immunologic: Negative for immunocompromised state.   Neurological: Negative for dizziness.   Hematological: Does not bruise/bleed easily.   Psychiatric/Behavioral: Negative.        Vitals:    02/13/18 1412   BP: 124/76   Temp: 98.1 °F (36.7 °C)     /76 (BP Location: Left arm, Patient Position: Sitting)  Temp 98.1 °F (36.7 °C) (Oral)   Ht 152.4 cm (60\")  Wt 67 kg (147 lb 12.8 oz)  BMI 28.87 kg/m2  Physical Exam   Constitutional: She is oriented to person, place, and time. She appears well-developed and well-nourished.   HENT:   Head: Normocephalic and atraumatic.   Eyes: Pupils are equal, round, and reactive to light.   Cardiovascular: Normal rate, regular rhythm and normal heart sounds.    Pulmonary/Chest: Effort normal and breath sounds normal.   Abdominal: Soft. Bowel sounds are normal. She exhibits no shifting dullness, no distension, no pulsatile liver, no fluid wave, no abdominal bruit, no ascites, no pulsatile midline mass and no mass. There is no hepatosplenomegaly. There is no tenderness. There is no rigidity and no guarding. No hernia.   Musculoskeletal: Normal range of motion.   Neurological: She is alert and oriented to person, place, and time.   Skin: Skin is warm and dry.   Psychiatric: She has a normal mood and affect. Her behavior is normal. Thought content normal.   Nursing note and vitals reviewed.      ASSESSMENT AND PLAN    Itzel was seen today for abdominal pain.    Diagnoses and all orders for this visit:    Generalized abdominal pain  -     Case Request; Standing  -     Case Request    Alternating constipation and diarrhea  -     Case Request; Standing  -     Case Request    Epigastric pain  -     Case Request; Standing  -     Case Request    Gastroesophageal reflux disease, esophagitis presence not specified  -     Case " Request; Standing  -     Case Request    History of Helicobacter pylori infection  Comments:  Finishing prevpac started by PCP for + hpylori IgM  Orders:  -     Case Request; Standing  -     Case Request    Tobacco abuse  Comments:  2 ppd smoker    Other orders  -     Implement Anesthesia orders day of procedure.; Standing  -     Obtain informed consent; Standing  -     Verify bowel prep was successful; Standing  -     Give tap water enema if bowel prep was insufficient; Standing    Finished Prevpac.  Continue PPI.  FD Lesvia as needed  EGD and colonoscopy with Dr. Ornelas for further evaluation of symptoms.    For any additional questions, concerns or changes to your condition after today's office visit please contact the office at [phone].Over 25min were spent in face to face counseling with patient.          Sandra MOSELEY   [facility]  [address] [place] [address]  Office: [phone]

## 2018-03-06 ENCOUNTER — ANESTHESIA EVENT (OUTPATIENT)
Dept: GASTROENTEROLOGY | Facility: HOSPITAL | Age: 63
End: 2018-03-06

## 2018-03-06 ENCOUNTER — HOSPITAL ENCOUNTER (OUTPATIENT)
Facility: HOSPITAL | Age: 63
Setting detail: HOSPITAL OUTPATIENT SURGERY
Discharge: HOME | End: 2018-03-06
Attending: INTERNAL MEDICINE | Admitting: INTERNAL MEDICINE

## 2018-03-06 ENCOUNTER — ANESTHESIA (OUTPATIENT)
Dept: GASTROENTEROLOGY | Facility: HOSPITAL | Age: 63
End: 2018-03-06

## 2018-03-06 VITALS
OXYGEN SATURATION: 98 % | TEMPERATURE: 98.1 F | SYSTOLIC BLOOD PRESSURE: 120 MMHG | WEIGHT: 142 LBS | HEART RATE: 64 BPM | BODY MASS INDEX: 27.73 KG/M2 | DIASTOLIC BLOOD PRESSURE: 73 MMHG | RESPIRATION RATE: 16 BRPM

## 2018-03-06 DIAGNOSIS — Z86.19 HISTORY OF HELICOBACTER PYLORI INFECTION: ICD-10-CM

## 2018-03-06 DIAGNOSIS — R10.13 EPIGASTRIC PAIN: ICD-10-CM

## 2018-03-06 DIAGNOSIS — R10.84 GENERALIZED ABDOMINAL PAIN: ICD-10-CM

## 2018-03-06 DIAGNOSIS — K21.9 GASTROESOPHAGEAL REFLUX DISEASE, ESOPHAGITIS PRESENCE NOT SPECIFIED: ICD-10-CM

## 2018-03-06 DIAGNOSIS — R19.8 ALTERNATING CONSTIPATION AND DIARRHEA: ICD-10-CM

## 2018-03-06 PROCEDURE — 25010000002 PROPOFOL 10 MG/ML EMULSION: Performed by: NURSE ANESTHETIST, CERTIFIED REGISTERED

## 2018-03-06 PROCEDURE — 88305 TISSUE EXAM BY PATHOLOGIST: CPT | Performed by: INTERNAL MEDICINE

## 2018-03-06 PROCEDURE — 88312 SPECIAL STAINS GROUP 1: CPT | Performed by: INTERNAL MEDICINE

## 2018-03-06 PROCEDURE — 43239 EGD BIOPSY SINGLE/MULTIPLE: CPT | Performed by: INTERNAL MEDICINE

## 2018-03-06 PROCEDURE — 45380 COLONOSCOPY AND BIOPSY: CPT | Performed by: INTERNAL MEDICINE

## 2018-03-06 RX ORDER — PROMETHAZINE HYDROCHLORIDE 25 MG/ML
12.5 INJECTION, SOLUTION INTRAMUSCULAR; INTRAVENOUS ONCE AS NEEDED
Status: DISCONTINUED | OUTPATIENT
Start: 2018-03-06 | End: 2018-03-06 | Stop reason: HOSPADM

## 2018-03-06 RX ORDER — PROMETHAZINE HYDROCHLORIDE 25 MG/1
25 SUPPOSITORY RECTAL ONCE AS NEEDED
Status: DISCONTINUED | OUTPATIENT
Start: 2018-03-06 | End: 2018-03-06 | Stop reason: HOSPADM

## 2018-03-06 RX ORDER — SODIUM CHLORIDE 0.9 % (FLUSH) 0.9 %
3 SYRINGE (ML) INJECTION AS NEEDED
Status: DISCONTINUED | OUTPATIENT
Start: 2018-03-06 | End: 2018-03-06 | Stop reason: HOSPADM

## 2018-03-06 RX ORDER — LIDOCAINE HYDROCHLORIDE 10 MG/ML
0.5 INJECTION, SOLUTION INFILTRATION; PERINEURAL ONCE AS NEEDED
Status: DISCONTINUED | OUTPATIENT
Start: 2018-03-06 | End: 2018-03-06 | Stop reason: HOSPADM

## 2018-03-06 RX ORDER — ONDANSETRON 2 MG/ML
4 INJECTION INTRAMUSCULAR; INTRAVENOUS ONCE AS NEEDED
Status: DISCONTINUED | OUTPATIENT
Start: 2018-03-06 | End: 2018-03-06 | Stop reason: HOSPADM

## 2018-03-06 RX ORDER — HYDRALAZINE HYDROCHLORIDE 20 MG/ML
5 INJECTION INTRAMUSCULAR; INTRAVENOUS
Status: DISCONTINUED | OUTPATIENT
Start: 2018-03-06 | End: 2018-03-06 | Stop reason: HOSPADM

## 2018-03-06 RX ORDER — PROPOFOL 10 MG/ML
VIAL (ML) INTRAVENOUS AS NEEDED
Status: DISCONTINUED | OUTPATIENT
Start: 2018-03-06 | End: 2018-03-06 | Stop reason: SURG

## 2018-03-06 RX ORDER — EPHEDRINE SULFATE 50 MG/ML
5 INJECTION, SOLUTION INTRAVENOUS ONCE AS NEEDED
Status: DISCONTINUED | OUTPATIENT
Start: 2018-03-06 | End: 2018-03-06 | Stop reason: HOSPADM

## 2018-03-06 RX ORDER — FLUMAZENIL 0.1 MG/ML
0.2 INJECTION INTRAVENOUS AS NEEDED
Status: DISCONTINUED | OUTPATIENT
Start: 2018-03-06 | End: 2018-03-06 | Stop reason: HOSPADM

## 2018-03-06 RX ORDER — LIDOCAINE HYDROCHLORIDE 20 MG/ML
INJECTION, SOLUTION INFILTRATION; PERINEURAL AS NEEDED
Status: DISCONTINUED | OUTPATIENT
Start: 2018-03-06 | End: 2018-03-06 | Stop reason: SURG

## 2018-03-06 RX ORDER — SODIUM CHLORIDE, SODIUM LACTATE, POTASSIUM CHLORIDE, CALCIUM CHLORIDE 600; 310; 30; 20 MG/100ML; MG/100ML; MG/100ML; MG/100ML
1000 INJECTION, SOLUTION INTRAVENOUS CONTINUOUS PRN
Status: DISCONTINUED | OUTPATIENT
Start: 2018-03-06 | End: 2018-03-06 | Stop reason: HOSPADM

## 2018-03-06 RX ORDER — PROMETHAZINE HYDROCHLORIDE 12.5 MG/1
12.5 TABLET ORAL ONCE AS NEEDED
Status: DISCONTINUED | OUTPATIENT
Start: 2018-03-06 | End: 2018-03-06 | Stop reason: HOSPADM

## 2018-03-06 RX ORDER — NALOXONE HCL 0.4 MG/ML
0.2 VIAL (ML) INJECTION AS NEEDED
Status: DISCONTINUED | OUTPATIENT
Start: 2018-03-06 | End: 2018-03-06 | Stop reason: HOSPADM

## 2018-03-06 RX ORDER — PROPOFOL 10 MG/ML
VIAL (ML) INTRAVENOUS CONTINUOUS PRN
Status: DISCONTINUED | OUTPATIENT
Start: 2018-03-06 | End: 2018-03-06 | Stop reason: SURG

## 2018-03-06 RX ORDER — DIPHENHYDRAMINE HYDROCHLORIDE 50 MG/ML
12.5 INJECTION INTRAMUSCULAR; INTRAVENOUS
Status: DISCONTINUED | OUTPATIENT
Start: 2018-03-06 | End: 2018-03-06 | Stop reason: HOSPADM

## 2018-03-06 RX ORDER — LABETALOL HYDROCHLORIDE 5 MG/ML
5 INJECTION, SOLUTION INTRAVENOUS
Status: DISCONTINUED | OUTPATIENT
Start: 2018-03-06 | End: 2018-03-06 | Stop reason: HOSPADM

## 2018-03-06 RX ORDER — PROMETHAZINE HYDROCHLORIDE 25 MG/1
25 TABLET ORAL ONCE AS NEEDED
Status: DISCONTINUED | OUTPATIENT
Start: 2018-03-06 | End: 2018-03-06 | Stop reason: HOSPADM

## 2018-03-06 RX ADMIN — SODIUM CHLORIDE, POTASSIUM CHLORIDE, SODIUM LACTATE AND CALCIUM CHLORIDE 1000 ML: 600; 310; 30; 20 INJECTION, SOLUTION INTRAVENOUS at 09:54

## 2018-03-06 RX ADMIN — SODIUM CHLORIDE, POTASSIUM CHLORIDE, SODIUM LACTATE AND CALCIUM CHLORIDE: 600; 310; 30; 20 INJECTION, SOLUTION INTRAVENOUS at 09:56

## 2018-03-06 RX ADMIN — PROPOFOL 160 MCG/KG/MIN: 10 INJECTION, EMULSION INTRAVENOUS at 09:58

## 2018-03-06 RX ADMIN — LIDOCAINE HYDROCHLORIDE 60 MG: 20 INJECTION, SOLUTION INFILTRATION; PERINEURAL at 10:00

## 2018-03-06 RX ADMIN — PROPOFOL 50 MG: 10 INJECTION, EMULSION INTRAVENOUS at 10:01

## 2018-03-06 RX ADMIN — PROPOFOL 50 MG: 10 INJECTION, EMULSION INTRAVENOUS at 10:00

## 2018-03-06 NOTE — PLAN OF CARE
Problem: Patient Care Overview (Adult)  Goal: Plan of Care Review  Outcome: Ongoing (interventions implemented as appropriate)   03/06/18 0915   Coping/Psychosocial Response Interventions   Plan Of Care Reviewed With patient   Patient Care Overview   Progress progress toward functional goals as expected     Goal: Adult Individualization and Mutuality  Outcome: Ongoing (interventions implemented as appropriate)   03/06/18 0915   Individualization   Patient Specific Preferences none identified     Goal: Discharge Needs Assessment  Outcome: Ongoing (interventions implemented as appropriate)   03/06/18 0915   Discharge Needs Assessment   Concerns To Be Addressed no discharge needs identified   Discharge Disposition home or self-care   Living Environment   Transportation Available car;family or friend will provide       Problem: GI Endoscopy (Adult)  Goal: Signs and Symptoms of Listed Potential Problems Will be Absent or Manageable (GI Endoscopy)  Outcome: Ongoing (interventions implemented as appropriate)   03/06/18 0915   GI Endoscopy   Problems Assessed (GI Endoscopy) all

## 2018-03-06 NOTE — ANESTHESIA PREPROCEDURE EVALUATION
Anesthesia Evaluation     Patient summary reviewed   NPO Solid Status: > 8 hours  NPO Liquid Status: > 4 hours           Airway   Mallampati: I  TM distance: >3 FB  Dental      Pulmonary    (+) a smoker Current Smoked day of surgery, COPD,   Cardiovascular     Rhythm: regular  Rate: normal    (+) hyperlipidemia,       Neuro/Psych  GI/Hepatic/Renal/Endo    (+)  GERD,      Musculoskeletal     Abdominal    Substance History      OB/GYN          Other   (+) arthritis                     Anesthesia Plan    ASA 2     MAC   total IV anesthesia  Anesthetic plan and risks discussed with patient.    Plan discussed with CRNA.

## 2018-03-06 NOTE — DISCHARGE INSTRUCTIONS
For the next 24 hours patient needs to be with a responsible adult.    For 24 hours DO NOT drive, operate machinery, appliances, drink alcohol, make important decisions or sign legal documents.    Start with a light or bland diet and advance to regular diet as tolerated.    You may have a sore, scratchy throat today.    Follow recommendations on procedure report provided by your doctor.    Call Dr Ornelas for problems [phone]    Problems may include but not limited to: large amounts of bleeding, trouble breathing, repeated vomiting, severe unrelieved pain, fever or chills.

## 2018-03-06 NOTE — ANESTHESIA POSTPROCEDURE EVALUATION
Patient: Itzel Roth    Procedure Summary     Date Anesthesia Start Anesthesia Stop Room / Location    03/06/18 0956 1030  [facility]       Procedure Diagnosis Surgeon Provider    ESOPHAGOGASTRODUODENOSCOPY WITH BIOPSIES (N/A Esophagus); COLONOSCOPY INTO CECUM WITH POLYPECTOMY X 3 (N/A ) Epigastric pain; Generalized abdominal pain; Alternating constipation and diarrhea; History of Helicobacter pylori infection; Gastroesophageal reflux disease, esophagitis presence not specified  (Epigastric pain [R10.13]; Generalized abdominal pain [R10.84]; Alternating constipation and diarrhea [R19.8]; History of Helicobacter pylori infection [Z86.19]; Gastroesophageal reflux disease, esophagitis presence not specified [K21.9]) MD Ingrid Kennedy MD          Anesthesia Type: MAC  Last vitals  BP   127/72 (03/06/18 1037)   Temp   36.7 °C (98.1 °F) (03/06/18 1027)   Pulse   69 (03/06/18 1037)   Resp   16 (03/06/18 1037)     SpO2   98 % (03/06/18 1037)     Post Anesthesia Care and Evaluation    Patient location during evaluation: PACU  Patient participation: complete - patient participated  Level of consciousness: awake  Pain management: adequate  Airway patency: patent  Anesthetic complications: No anesthetic complications  PONV Status: none  Cardiovascular status: acceptable  Respiratory status: acceptable  Hydration status: acceptable

## 2018-03-06 NOTE — H&P (VIEW-ONLY)
"Chief Complaint   Patient presents with   • Abdominal Pain         HPI    Pt is being seen today for A follow-up.  She was last seen by Dr. Ornelas in 2017 for complaints of abdominal pain and bloating.  She also complained of alternating constipation and diarrhea with fecal urgency.  Dr. Ornelas  ordered an EGD and colonoscopy but she did not follow up for endoscopic evaluation.  She reports a history of H. pylori.  She was recently seen by her PCP and had a positive serum H. pylori test and finishes Prevpac tomorrow. Symptoms have not improved with treatment    She continues to have generalized abdominal pain.  Upper abdominal pain is described as a burning sensation with abdominal bloating and upper abdominal fullness.  Associated symptoms include nausea, excessive belching, and postprandial fullness.  She also has lower abdominal cramping, excess flatus, and alternating bowel habits.  She will have diarrhea-several soft small BMs in rapid succession occurring in the morning associated with fecal urgency.  She also has several days of constipation or hard stools.  She states she cannot eat anything without having some sort of unwanted GI symptom.  + Generalized abdominal discomfort, abdominal bloating.   Weight is stable, she denies fever, chills, lack stool or blood in her stool.  She denies nocturnal awakenings.   Sister has celiac disease but ports negative testing.  She states she has an allergy to wheat but \"cheats\" on occasion. 2 pack per day smoker. States mom  from hpylori but  gastric cancer in her mother is listed in history. ETOH several times per week.     She is hesitant to undergo EGD and colonoscopy stating that endoscopic evaluation performed 6 years ago was negative.  Records are not available    History reviewed. No pertinent past medical history.  Past Surgical History:   Procedure Laterality Date   • APPENDECTOMY     • FOOT SURGERY      both feet   • HYSTERECTOMY     • OOPHORECTOMY  "    • SHOULDER SURGERY      both shoulders   • WISDOM TOOTH EXTRACTION         Current Outpatient Prescriptions   Medication Sig Dispense Refill   • amoxicillin-clarithromycin-lansoprazole (PREVPAC) combo pack Take  by mouth 2 (Two) Times a Day. Follow package directions. 1 kit 0   • budesonide-formoterol (SYMBICORT) 160-4.5 MCG/ACT inhaler Inhale 2 puffs 2 (two) times a day. 10.2 g 6   • CALCIUM PO Take  by mouth.     • Cholecalciferol (VITAMIN D-3 PO) Take  by mouth.     • guaifenesin-dextromethorphan (MUCINEX DM)  MG tablet sustained-release 12 hour tablet Take 1 tablet by mouth 2 (Two) Times a Day. 45 tablet 0   • NON FORMULARY VitalBiome     • NON FORMULARY Goodzymes       Current Facility-Administered Medications   Medication Dose Route Frequency Provider Last Rate Last Dose   • albuterol (PROVENTIL) nebulizer solution 2.5 mg  2.5 mg Nebulization Once [place] LORELEI Hernandez           PRN Meds:.    Allergies   Allergen Reactions   • Morphine        Social History     Social History   • Marital status:      Spouse name: N/A   • Number of children: N/A   • Years of education: N/A     Occupational History   • Not on file.     Social History Main Topics   • Smoking status: Current Every Day Smoker   • Smokeless tobacco: Never Used   • Alcohol use 4.2 oz/week     7 Glasses of wine per week   • Drug use: No   • Sexual activity: Not on file     Other Topics Concern   • Not on file     Social History Narrative       Family History   Problem Relation Age of Onset   • Cancer Mother      lymphocercoma   • Breast cancer Maternal Grandmother 104       Review of Systems   Constitutional: Negative for appetite change, chills, diaphoresis, fatigue, fever and unexpected weight change.   HENT: Negative for trouble swallowing.    Respiratory: Negative for choking and shortness of breath.    Cardiovascular: Negative for chest pain.   Gastrointestinal: Positive for abdominal distention, abdominal pain,  "constipation and nausea. Negative for blood in stool, diarrhea and vomiting.   Musculoskeletal: Negative for back pain.   Skin: Negative for color change.   Allergic/Immunologic: Negative for immunocompromised state.   Neurological: Negative for dizziness.   Hematological: Does not bruise/bleed easily.   Psychiatric/Behavioral: Negative.        Vitals:    02/13/18 1412   BP: 124/76   Temp: 98.1 °F (36.7 °C)     /76 (BP Location: Left arm, Patient Position: Sitting)  Temp 98.1 °F (36.7 °C) (Oral)   Ht 152.4 cm (60\")  Wt 67 kg (147 lb 12.8 oz)  BMI 28.87 kg/m2  Physical Exam   Constitutional: She is oriented to person, place, and time. She appears well-developed and well-nourished.   HENT:   Head: Normocephalic and atraumatic.   Eyes: Pupils are equal, round, and reactive to light.   Cardiovascular: Normal rate, regular rhythm and normal heart sounds.    Pulmonary/Chest: Effort normal and breath sounds normal.   Abdominal: Soft. Bowel sounds are normal. She exhibits no shifting dullness, no distension, no pulsatile liver, no fluid wave, no abdominal bruit, no ascites, no pulsatile midline mass and no mass. There is no hepatosplenomegaly. There is no tenderness. There is no rigidity and no guarding. No hernia.   Musculoskeletal: Normal range of motion.   Neurological: She is alert and oriented to person, place, and time.   Skin: Skin is warm and dry.   Psychiatric: She has a normal mood and affect. Her behavior is normal. Thought content normal.   Nursing note and vitals reviewed.      ASSESSMENT AND PLAN    Itzel was seen today for abdominal pain.    Diagnoses and all orders for this visit:    Generalized abdominal pain  -     Case Request; Standing  -     Case Request    Alternating constipation and diarrhea  -     Case Request; Standing  -     Case Request    Epigastric pain  -     Case Request; Standing  -     Case Request    Gastroesophageal reflux disease, esophagitis presence not specified  -     Case " Request; Standing  -     Case Request    History of Helicobacter pylori infection  Comments:  Finishing prevpac started by PCP for + hpylori IgM  Orders:  -     Case Request; Standing  -     Case Request    Tobacco abuse  Comments:  2 ppd smoker    Other orders  -     Implement Anesthesia orders day of procedure.; Standing  -     Obtain informed consent; Standing  -     Verify bowel prep was successful; Standing  -     Give tap water enema if bowel prep was insufficient; Standing    Finished Prevpac.  Continue PPI.  FD Lesvia as needed  EGD and colonoscopy with Dr. Ornelas for further evaluation of symptoms.    For any additional questions, concerns or changes to your condition after today's office visit please contact the office at [phone].Over 25min were spent in face to face counseling with patient.          Sandra MOSELEY   [facility]  [address] [place] [address]  Office: [phone]

## 2018-03-07 LAB
CYTO UR: NORMAL
LAB AP CASE REPORT: NORMAL
Lab: NORMAL
PATH REPORT.FINAL DX SPEC: NORMAL
PATH REPORT.GROSS SPEC: NORMAL

## 2018-03-08 RX ORDER — BUDESONIDE AND FORMOTEROL FUMARATE DIHYDRATE 160; 4.5 UG/1; UG/1
AEROSOL RESPIRATORY (INHALATION)
Qty: 1 INHALER | Refills: 0 | Status: SHIPPED | OUTPATIENT
Start: 2018-03-08

## 2018-03-19 NOTE — PROGRESS NOTES
Updated path did show some fungal elements on esophagus bx  Please send rx for Fluconazole 200mg x 1 day, then 100mg/day for 13 days    Follow up in office in 4 weeks

## 2018-03-27 ENCOUNTER — TELEPHONE (OUTPATIENT)
Dept: GASTROENTEROLOGY | Facility: CLINIC | Age: 63
End: 2018-03-27

## 2018-03-27 RX ORDER — FLUCONAZOLE 100 MG/1
TABLET ORAL
Qty: 15 TABLET | Refills: 0 | Status: SHIPPED | OUTPATIENT
Start: 2018-03-27 | End: 2019-01-04

## 2018-03-27 NOTE — TELEPHONE ENCOUNTER
----- Message from Del Ornelas MD sent at 3/19/2018 12:04 PM EDT -----  Updated path did show some fungal elements on esophagus bx  Please send rx for Fluconazole 200mg x 1 day, then 100mg/day for 13 days    Follow up in office in 4 weeks

## 2018-03-28 NOTE — TELEPHONE ENCOUNTER
Pt returned call per a staff message.    Called pt back. Advised that per Dr Ornelas: the updated path results did show a fungal element on the esophageal biopsy. Advised that I have sent the prescription for fluconazole to her pharmacy for her and this will eradicate that infection for her. Advised MD wants to see her back in the office in 4 weeks. Pt verb understanding. Appt scheduled for 5/8 at 1 PM.

## 2019-01-04 ENCOUNTER — OFFICE VISIT (OUTPATIENT)
Dept: FAMILY MEDICINE CLINIC | Facility: CLINIC | Age: 64
End: 2019-01-04

## 2019-01-04 VITALS
DIASTOLIC BLOOD PRESSURE: 78 MMHG | SYSTOLIC BLOOD PRESSURE: 128 MMHG | WEIGHT: 150.8 LBS | TEMPERATURE: 98.3 F | HEIGHT: 60 IN | RESPIRATION RATE: 16 BRPM | HEART RATE: 77 BPM | OXYGEN SATURATION: 95 % | BODY MASS INDEX: 29.61 KG/M2

## 2019-01-04 DIAGNOSIS — R05.9 COUGH: ICD-10-CM

## 2019-01-04 DIAGNOSIS — J22 LOWER RESPIRATORY INFECTION (E.G., BRONCHITIS, PNEUMONIA, PNEUMONITIS, PULMONITIS): Primary | ICD-10-CM

## 2019-01-04 PROCEDURE — 99213 OFFICE O/P EST LOW 20 MIN: CPT | Performed by: NURSE PRACTITIONER

## 2019-01-04 RX ORDER — AZITHROMYCIN 500 MG/1
500 TABLET, FILM COATED ORAL DAILY
Qty: 5 TABLET | Refills: 1 | Status: SHIPPED | OUTPATIENT
Start: 2019-01-04 | End: 2019-02-04

## 2019-01-04 RX ORDER — DEXTROMETHORPHAN HYDROBROMIDE AND PROMETHAZINE HYDROCHLORIDE 15; 6.25 MG/5ML; MG/5ML
5 SYRUP ORAL 4 TIMES DAILY PRN
Qty: 473 ML | Refills: 0 | Status: SHIPPED | OUTPATIENT
Start: 2019-01-04 | End: 2019-10-24

## 2019-01-04 NOTE — PROGRESS NOTES
Subjective   Itzel Roth is a 63 y.o. female. Patient is being evaluated today for cough, headache, and chest congestion. She has been battling with this for about 2 months.   History of Present Illness 63-year-old  female presents to the office with complaint of a dry hacking cough, headache and chest congestion that began suddenly and has been continuous for 2 months. Has treated with OTC cough and cold and steroids, Vicks. Nothing makes her better or worse. On a scale of 1-10 rates her discomfort as 6-7 . Son at home dying of liver failure.    The following portions of the patient's history were reviewed and updated as appropriate: allergies, current medications, past family history, past medical history, past social history, past surgical history and problem list.    Review of Systems   Respiratory: Positive for cough.         Chest congestion.    Neurological: Positive for headache.   All other systems reviewed and are negative.      Objective   Physical Exam   Constitutional: She is oriented to person, place, and time. She appears well-developed and well-nourished.   HENT:   Head: Normocephalic and atraumatic.   Nasal membranes red and swollen.   Eyes: EOM are normal. Pupils are equal, round, and reactive to light.   Neck: Normal range of motion. Neck supple.   Cardiovascular: Normal rate and regular rhythm.   Pulmonary/Chest: Effort normal. She has rales.   Coughing with deep breathing.lt  Side lung fields upper and lower with fine crackles.   Abdominal: Soft. Bowel sounds are normal.   Musculoskeletal: Normal range of motion.   Neurological: She is alert and oriented to person, place, and time.   Skin: Skin is warm and dry. Capillary refill takes less than 2 seconds.   Psychiatric: She has a normal mood and affect. Her behavior is normal. Judgment and thought content normal.   Nursing note and vitals reviewed.        Assessment/Plan   Itzel was seen today for cough, chest congestion and  headache.    Diagnoses and all orders for this visit:    Lower respiratory infection (e.g., bronchitis, pneumonia, pneumonitis, pulmonitis)  -     azithromycin (ZITHROMAX) 500 MG tablet; Take 1 tablet by mouth Daily.    Cough  -     promethazine-dextromethorphan (PROMETHAZINE-DM) 6.25-15 MG/5ML syrup; Take 5 mL by mouth 4 (Four) Times a Day As Needed for Cough.    Will TX with Zithromax 1 tablet a day until gone. May repeat once if needed. Pt usually needs 2 rounds of antibiotics to heal.  Promethazine DM 1 teaspoon 4 times a day for cough. Patient to stay indoors out of inclement weather. To allow herself 10-12 hours a night of sleep. Alternate ibuprofen and Tylenol for body aches and headache. To sip constantly during the waking hours. Made aware that cough may last another month or 2. To follow-up with me in 10 days if congestion not improving or if she develops a fever. To notify office immediately of decline in health status.

## 2019-01-22 ENCOUNTER — TELEPHONE (OUTPATIENT)
Dept: FAMILY MEDICINE CLINIC | Facility: CLINIC | Age: 64
End: 2019-01-22

## 2019-01-22 NOTE — TELEPHONE ENCOUNTER
Patient called states she is still coughing and with bronchitis,  states fatigued and congested diagnosed with bronchitis last week, states she is suppose to travel to [place] on 02/09/19 and she isn't going to be physically able to go on the trip and her son is very ill and could be dying, needs a letter stating that she can't travel to get a refund on the trip

## 2019-01-22 NOTE — TELEPHONE ENCOUNTER
Letter has been written. Please call patient let her know she can  the letter or we will mail it to her.

## 2019-01-25 ENCOUNTER — DOCUMENTATION (OUTPATIENT)
Dept: FAMILY MEDICINE CLINIC | Facility: CLINIC | Age: 64
End: 2019-01-25

## 2019-01-25 ENCOUNTER — TELEPHONE (OUTPATIENT)
Dept: FAMILY MEDICINE CLINIC | Facility: CLINIC | Age: 64
End: 2019-01-25

## 2019-01-25 NOTE — TELEPHONE ENCOUNTER
Patient came in and said the letter Aida wrote regarding her upcoming trip to [facility] on 2-9-19 needs to be more specific stating her medical conditions and why she is unable to get the travel immunizations and plus her son was recently diagnosed with Hepatitis B.  Her best call back is [phone]

## 2019-01-25 NOTE — PROGRESS NOTES
Called pt home spoke with . Read letter about pt illness preventing her from going to [place] . Ask pt to pick it up on Monday. [employer] can me with any questions, if pt approves.

## 2019-02-04 ENCOUNTER — OFFICE VISIT (OUTPATIENT)
Dept: FAMILY MEDICINE CLINIC | Facility: CLINIC | Age: 64
End: 2019-02-04

## 2019-02-04 VITALS
HEIGHT: 60 IN | BODY MASS INDEX: 29.68 KG/M2 | RESPIRATION RATE: 16 BRPM | DIASTOLIC BLOOD PRESSURE: 84 MMHG | WEIGHT: 151.2 LBS | HEART RATE: 81 BPM | TEMPERATURE: 97.9 F | SYSTOLIC BLOOD PRESSURE: 130 MMHG | OXYGEN SATURATION: 98 %

## 2019-02-04 DIAGNOSIS — J44.1 COPD EXACERBATION (HCC): Primary | ICD-10-CM

## 2019-02-04 PROCEDURE — 99213 OFFICE O/P EST LOW 20 MIN: CPT | Performed by: FAMILY MEDICINE

## 2019-02-04 RX ORDER — PREDNISONE 20 MG/1
40 TABLET ORAL DAILY
Qty: 10 TABLET | Refills: 0 | Status: SHIPPED | OUTPATIENT
Start: 2019-02-04 | End: 2019-10-24

## 2019-02-04 NOTE — PROGRESS NOTES
Subjective   Itzel Roth is a 63 y.o. female. Presents today to be evaluated for cough/congestion x 3 weeks.     History of Present Illness     She has had cough/congestion for about 3 weeks.  This is a productive cough.  Hx of smoker.  Taking Mucinex, Spiriva and Symbicort.  Endorses wheezing.  She says she has had steroids for this in the past which seemed to help although she thinks the dose she had at the time was too high.    The following portions of the patient's history were reviewed and updated as appropriate: allergies, current medications, past family history, past medical history, past social history, past surgical history and problem list.    Review of Systems   HENT: Positive for congestion and postnasal drip.    Respiratory: Positive for cough.        Objective   Physical Exam   Constitutional: She appears well-nourished. No distress.   Cardiovascular: Normal rate, regular rhythm and normal heart sounds. Exam reveals no gallop and no friction rub.   No murmur heard.  Pulmonary/Chest: Effort normal. She has wheezes. She has no rales.   Skin: Skin is warm. Capillary refill takes less than 2 seconds.   Nursing note and vitals reviewed.      Assessment/Plan   Itzel was seen today for cough and nasal congestion.    Diagnoses and all orders for this visit:    COPD exacerbation (CMS/[facility])  -     predniSONE (DELTASONE) 20 MG tablet; Take 2 tablets by mouth Daily.      Plan as above.  Likely a chronic bronchitis with a COPD exacerbation.

## 2019-10-24 ENCOUNTER — OFFICE VISIT (OUTPATIENT)
Dept: FAMILY MEDICINE CLINIC | Facility: CLINIC | Age: 64
End: 2019-10-24

## 2019-10-24 VITALS
HEIGHT: 60 IN | OXYGEN SATURATION: 96 % | WEIGHT: 150.4 LBS | RESPIRATION RATE: 16 BRPM | HEART RATE: 92 BPM | TEMPERATURE: 98.6 F | SYSTOLIC BLOOD PRESSURE: 132 MMHG | BODY MASS INDEX: 29.53 KG/M2 | DIASTOLIC BLOOD PRESSURE: 78 MMHG

## 2019-10-24 DIAGNOSIS — J02.9 SORE THROAT: Primary | ICD-10-CM

## 2019-10-24 LAB
EXPIRATION DATE: NORMAL
INTERNAL CONTROL: NORMAL
Lab: NORMAL
S PYO AG THROAT QL: NEGATIVE

## 2019-10-24 PROCEDURE — 99213 OFFICE O/P EST LOW 20 MIN: CPT | Performed by: FAMILY MEDICINE

## 2019-10-24 PROCEDURE — 87880 STREP A ASSAY W/OPTIC: CPT | Performed by: FAMILY MEDICINE

## 2019-10-24 NOTE — PROGRESS NOTES
Subjective   Itzel Roth is a 64 y.o. female. Presents today to be evaluated for sore throat/swollen glands and headaches x 2 days.     History of Present Illness     Sore Throat  Patient complains of sore throat. Associated symptoms include nasal blockage, post nasal drip, sinus and nasal congestion and sore throat. Onset of symptoms was 2 days ago, and have been unchanged since that time. She is drinking plenty of fluids. She has not had recent close exposure to someone with proven streptococcal pharyngitis.  She is not treating herself with anything.  Nothing makes it better or worse.    The following portions of the patient's history were reviewed and updated as appropriate: allergies, current medications, past family history, past medical history, past social history, past surgical history and problem list.    Review of Systems   HENT: Positive for sore throat.         Swollen glands.    Neurological: Positive for headaches.   All other systems reviewed and are negative.      Objective   Physical Exam   Constitutional: No distress.   Appears ill but nontoxic   HENT:   Right Ear: Hearing, tympanic membrane, external ear and ear canal normal.   Left Ear: Hearing, tympanic membrane, external ear and ear canal normal.   Nose: Nose normal. Right sinus exhibits no maxillary sinus tenderness and no frontal sinus tenderness. Left sinus exhibits no maxillary sinus tenderness and no frontal sinus tenderness.   Mouth/Throat: Uvula is midline and mucous membranes are normal. Oropharyngeal exudate present.   Eyes: Conjunctivae are normal. Right eye exhibits no discharge. Left eye exhibits no discharge.   Neck: Neck supple.   Cardiovascular: Normal rate, regular rhythm and normal heart sounds. Exam reveals no gallop and no friction rub.   No murmur heard.  Pulmonary/Chest: Effort normal and breath sounds normal. She has no wheezes. She has no rales.   Lymphadenopathy:     She has no cervical adenopathy.   Skin: Skin is  warm. Capillary refill takes less than 2 seconds. She is not diaphoretic.   Nursing note and vitals reviewed.  Strep test negative    Assessment/Plan   Itzel was seen today for sore throat and headache.    Diagnoses and all orders for this visit:    Sore throat  -     POCT rapid strep A    Patient appears to have a oral pharyngitis likely from a virus.  We discussed different supportive care options regarding pain and trying to keep the inflammation down.  Also gave her some samples of Tylenol for her pain regarding her neck.  I told her probably a few weeks where she is completely back to normal.  Follow back up as needed.

## 2019-10-24 NOTE — PATIENT INSTRUCTIONS
Pharyngitis    Pharyngitis is redness, pain, and swelling (inflammation) of the throat (pharynx). It is a very common cause of sore throat. Pharyngitis can be caused by a bacteria, but it is usually caused by a virus. Most cases of pharyngitis get better on their own without treatment.  What are the causes?  This condition may be caused by:  · Infection by viruses (viral). Viral pharyngitis spreads from person to person (is contagious) through coughing, sneezing, and sharing of personal items or utensils such as cups, forks, spoons, and toothbrushes.  · Infection by bacteria (bacterial). Bacterial pharyngitis may be spread by touching the nose or face after coming in contact with the bacteria, or through more intimate contact, such as kissing.  · Allergies. Allergies can cause buildup of mucus in the throat (post-nasal drip), leading to inflammation and irritation. Allergies can also cause blocked nasal passages, forcing breathing through the mouth, which dries and irritates the throat.  What increases the risk?  You are more likely to develop this condition if:  · You are 5-24 years old.  · You are exposed to crowded environments such as , school, or dormitory living.  · You live in a cold climate.  · You have a weakened disease-fighting (immune) system.  What are the signs or symptoms?  Symptoms of this condition vary by the cause (viral, bacterial, or allergies) and can include:  · Sore throat.  · Fatigue.  · Low-grade fever.  · Headache.  · Joint pain and muscle aches.  · Skin rashes.  · Swollen glands in the throat (lymph nodes).  · Plaque-like film on the throat or tonsils. This is often a symptom of bacterial pharyngitis.  · Vomiting.  · Stuffy nose (nasal congestion).  · Cough.  · Red, itchy eyes (conjunctivitis).  · Loss of appetite.  How is this diagnosed?  This condition is often diagnosed based on your medical history and a physical exam. Your health care provider will ask you questions about your  illness and your symptoms. A swab of your throat may be done to check for bacteria (rapid strep test). Other lab tests may also be done, depending on the suspected cause, but these are rare.  How is this treated?  This condition usually gets better in 3-4 days without medicine. Bacterial pharyngitis may be treated with antibiotic medicines.  Follow these instructions at home:  · Take over-the-counter and prescription medicines only as told by your health care provider.  ? If you were prescribed an antibiotic medicine, take it as told by your health care provider. Do not stop taking the antibiotic even if you start to feel better.  ? Do not give children aspirin because of the association with Reye syndrome.  · Drink enough water and fluids to keep your urine clear or pale yellow.  · Get a lot of rest.  · Gargle with a salt-water mixture 3-4 times a day or as needed. To make a salt-water mixture, completely dissolve ½-1 tsp of salt in 1 cup of warm water.  · If your health care provider approves, you may use throat lozenges or sprays to soothe your throat.  Contact a health care provider if:  · You have large, tender lumps in your neck.  · You have a rash.  · You cough up green, yellow-brown, or bloody spit.  Get help right away if:  · Your neck becomes stiff.  · You drool or are unable to swallow liquids.  · You cannot drink or take medicines without vomiting.  · You have severe pain that does not go away, even after you take medicine.  · You have trouble breathing, and it is not caused by a stuffy nose.  · You have new pain and swelling in your joints such as the knees, ankles, wrists, or elbows.  Summary  · Pharyngitis is redness, pain, and swelling (inflammation) of the throat (pharynx).  · While pharyngitis can be caused by a bacteria, the most common causes are viral.  · Most cases of pharyngitis get better on their own without treatment.  · Bacterial pharyngitis is treated with antibiotic medicines.  This  information is not intended to replace advice given to you by your health care provider. Make sure you discuss any questions you have with your health care provider.  Document Released: 12/18/2006 Document Revised: 01/23/2018 Document Reviewed: 01/23/2018  [employer] Interactive Patient Education © 2019 [employer]

## 2019-12-17 ENCOUNTER — OFFICE VISIT (OUTPATIENT)
Dept: FAMILY MEDICINE CLINIC | Facility: CLINIC | Age: 64
End: 2019-12-17

## 2019-12-17 VITALS
RESPIRATION RATE: 16 BRPM | HEART RATE: 76 BPM | BODY MASS INDEX: 29.96 KG/M2 | WEIGHT: 152.6 LBS | SYSTOLIC BLOOD PRESSURE: 118 MMHG | HEIGHT: 60 IN | DIASTOLIC BLOOD PRESSURE: 74 MMHG | TEMPERATURE: 98 F | OXYGEN SATURATION: 95 %

## 2019-12-17 DIAGNOSIS — B96.89 ACUTE BACTERIAL SINUSITIS: Primary | ICD-10-CM

## 2019-12-17 DIAGNOSIS — J01.90 ACUTE BACTERIAL SINUSITIS: Primary | ICD-10-CM

## 2019-12-17 PROCEDURE — 99213 OFFICE O/P EST LOW 20 MIN: CPT | Performed by: FAMILY MEDICINE
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
## 2019-12-17 NOTE — PATIENT INSTRUCTIONS
Sinusitis, Adult  Sinusitis is soreness and swelling (inflammation) of your sinuses. Sinuses are hollow spaces in the bones around your face. They are located:  · Around your eyes.  · In the middle of your forehead.  · Behind your nose.  · In your cheekbones.  Your sinuses and nasal passages are lined with a fluid called mucus. Mucus drains out of your sinuses. Swelling can trap mucus in your sinuses. This lets germs (bacteria, virus, or fungus) grow, which leads to infection. Most of the time, this condition is caused by a virus.  What are the causes?  This condition is caused by:  · Allergies.  · Asthma.  · Germs.  · Things that block your nose or sinuses.  · Growths in the nose (nasal polyps).  · Chemicals or irritants in the air.  · Fungus (rare).  What increases the risk?  You are more likely to develop this condition if:  · You have a weak body defense system (immune system).  · You do a lot of swimming or diving.  · You use nasal sprays too much.  · You smoke.  What are the signs or symptoms?  The main symptoms of this condition are pain and a feeling of pressure around the sinuses. Other symptoms include:  · Stuffy nose (congestion).  · Runny nose (drainage).  · Swelling and warmth in the sinuses.  · Headache.  · Toothache.  · A cough that may get worse at night.  · Mucus that collects in the throat or the back of the nose (postnasal drip).  · Being unable to smell and taste.  · Being very tired (fatigue).  · A fever.  · Sore throat.  · Bad breath.  How is this diagnosed?  This condition is diagnosed based on:  · Your symptoms.  · Your medical history.  · A physical exam.  · Tests to find out if your condition is short-term (acute) or long-term (chronic). Your doctor may:  ? Check your nose for growths (polyps).  ? Check your sinuses using a tool that has a light (endoscope).  ? Check for allergies or germs.  ? Do imaging tests, such as an MRI or CT scan.  How is this treated?  Treatment for this condition  depends on the cause and whether it is short-term or long-term.  · If caused by a virus, your symptoms should go away on their own within 10 days. You may be given medicines to relieve symptoms. They include:  ? Medicines that shrink swollen tissue in the nose.  ? Medicines that treat allergies (antihistamines).  ? A spray that treats swelling of the nostrils.   ? Rinses that help get rid of thick mucus in your nose (nasal saline washes).  · If caused by bacteria, your doctor may wait to see if you will get better without treatment. You may be given antibiotic medicine if you have:  ? A very bad infection.  ? A weak body defense system.  · If caused by growths in the nose, you may need to have surgery.  Follow these instructions at home:  Medicines  · Take, use, or apply over-the-counter and prescription medicines only as told by your doctor. These may include nasal sprays.  · If you were prescribed an antibiotic medicine, take it as told by your doctor. Do not stop taking the antibiotic even if you start to feel better.  Hydrate and humidify    · Drink enough water to keep your pee (urine) pale yellow.  · Use a cool mist humidifier to keep the humidity level in your home above 50%.  · Breathe in steam for 10-15 minutes, 3-4 times a day, or as told by your doctor. You can do this in the bathroom while a hot shower is running.  · Try not to spend time in cool or dry air.  Rest  · Rest as much as you can.  · Sleep with your head raised (elevated).  · Make sure you get enough sleep each night.  General instructions    · Put a warm, moist washcloth on your face 3-4 times a day, or as often as told by your doctor. This will help with discomfort.  · Wash your hands often with soap and water. If there is no soap and water, use hand .  · Do not smoke. Avoid being around people who are smoking (secondhand smoke).  · Keep all follow-up visits as told by your doctor. This is important.  Contact a doctor if:  · You  have a fever.  · Your symptoms get worse.  · Your symptoms do not get better within 10 days.  Get help right away if:  · You have a very bad headache.  · You cannot stop throwing up (vomiting).  · You have very bad pain or swelling around your face or eyes.  · You have trouble seeing.  · You feel confused.  · Your neck is stiff.  · You have trouble breathing.  Summary  · Sinusitis is swelling of your sinuses. Sinuses are hollow spaces in the bones around your face.  · This condition is caused by tissues in your nose that become inflamed or swollen. This traps germs. These can lead to infection.  · If you were prescribed an antibiotic medicine, take it as told by your doctor. Do not stop taking it even if you start to feel better.  · Keep all follow-up visits as told by your doctor. This is important.  This information is not intended to replace advice given to you by your health care provider. Make sure you discuss any questions you have with your health care provider.  Document Released: 06/05/2009 Document Revised: 05/20/2019 Document Reviewed: 05/20/2019  [employer] Interactive Patient Education © 2019 [employer]

## 2022-03-16 ENCOUNTER — TELEPHONE (OUTPATIENT)
Dept: INTERNAL MEDICINE | Facility: CLINIC | Age: 67
End: 2022-03-16

## 2022-03-16 NOTE — TELEPHONE ENCOUNTER
PATIENT STATES:THAT SHE WOULD LIKE A CALL BACK TO GET HER HIP X RAY PLEASE ADVISE      PATIENT CAN BE REACHED ON: [phone]

## 2022-10-07 ENCOUNTER — TELEPHONE (OUTPATIENT)
Dept: INTERNAL MEDICINE | Facility: CLINIC | Age: 67
End: 2022-10-07

## 2022-10-07 NOTE — TELEPHONE ENCOUNTER
Caller: Itzel Roth    Relationship: Self    Best call back number: [phone]    What medication are you requesting: UNKNOWN     What are your current symptoms: DIARRHEA     How long have you been experiencing symptoms: 6 DAYS     Additional notes: PATIENT HAS BEEN TAKING IMODIUM AND PEPTO-BISMOL, BUT THEY ARE NOT WORKING. PATIENT IS WONDERING IF THERE IS ANOTHER MEDICATION SHE COULD  AT HER LOCAL DRUG STORE THAT MIGHT HELP HER.    PLEASE CALL PATIENT TO DISCUSS AND ADVISE

## 2024-01-30 ENCOUNTER — OFFICE VISIT (OUTPATIENT)
Dept: FAMILY MEDICINE CLINIC | Facility: CLINIC | Age: 69
End: 2024-01-30
Payer: COMMERCIAL

## 2024-01-30 VITALS
WEIGHT: 159 LBS | TEMPERATURE: 97.1 F | OXYGEN SATURATION: 96 % | HEART RATE: 51 BPM | SYSTOLIC BLOOD PRESSURE: 124 MMHG | HEIGHT: 60 IN | DIASTOLIC BLOOD PRESSURE: 78 MMHG | BODY MASS INDEX: 31.22 KG/M2

## 2024-01-30 DIAGNOSIS — M25.551 PAIN OF RIGHT HIP: Primary | ICD-10-CM

## 2024-01-30 PROCEDURE — 99203 OFFICE O/P NEW LOW 30 MIN: CPT | Performed by: NURSE PRACTITIONER

## 2024-01-30 NOTE — PROGRESS NOTES
"Chief Complaint  Establish Care and Hip Pain (Patient states that her right pain has been bothering her for a few months.  )    Subjective        Itzel Roth presents to [facility]  History of Present Illness  This is a 68-year-old female patient here today to establish care.  Patient has seen Dr. Almodovar years ago but has not seen a primary care since that time.  She does not report any health concerns other than right hip discomfort.  She reports in November she was taking his Aamnda and put her leg on the edge of the shower when she brought it down she heard a pop and states she felt like it went out of socket.  This happened again 4 to 5 days later.  And since then she will have discomfort with any wrong movement.  She reports it will wake her up at night on occasion.  She denies any numbness or tingling.       Objective   Vital Signs:  /78   Pulse 51   Temp 97.1 °F (36.2 °C)   Ht 152.4 cm (60\")   Wt 72.1 kg (159 lb)   SpO2 96%   BMI 31.05 kg/m²   Estimated body mass index is 31.05 kg/m² as calculated from the following:    Height as of this encounter: 152.4 cm (60\").    Weight as of this encounter: 72.1 kg (159 lb).       BMI is >= 30 and <35. (Class 1 Obesity). The following options were offered after discussion;: exercise counseling/recommendations and nutrition counseling/recommendations      Physical Exam  Vitals and nursing note reviewed.   HENT:      Head: Normocephalic.      Nose: Nose normal.   Eyes:      Pupils: Pupils are equal, round, and reactive to light.   Cardiovascular:      Rate and Rhythm: Normal rate and regular rhythm.      Pulses: Normal pulses.      Heart sounds: Normal heart sounds.   Pulmonary:      Effort: Pulmonary effort is normal. No respiratory distress.      Breath sounds: Normal breath sounds. No wheezing or rales.   Abdominal:      General: Bowel sounds are normal. There is no distension.      Tenderness: There is no abdominal " tenderness.   Musculoskeletal:         General: No swelling. Normal range of motion.      Cervical back: Neck supple.      Right lower leg: No edema.      Left lower leg: No edema.   Skin:     General: Skin is warm and dry.   Neurological:      Mental Status: She is alert and oriented to person, place, and time.   Psychiatric:         Mood and Affect: Mood normal.        Result Review :                     Assessment and Plan     Diagnoses and all orders for this visit:    1. Pain of right hip (Primary)  -     Ambulatory Referral to Orthopedic Surgery      I offered x-ray in the office here today but patient would rather go through orthopedic for full evaluation.  She is requesting to see Dr. Dmitriy Francisco so I will put that referral in today.  We did discuss the need for labs and mammogram and patient declined any routine testing.  She understands her risk          Follow Up     No follow-ups on file.  Patient was given instructions and counseling regarding her condition or for health maintenance advice. Please see specific information pulled into the AVS if appropriate.

## (undated) DEVICE — SINGLE-USE BIOPSY FORCEPS: Brand: RADIAL JAW 4

## (undated) DEVICE — BITEBLOCK OMNI BLOC

## (undated) DEVICE — TUBING, SUCTION, 1/4" X 10', STRAIGHT: Brand: MEDLINE

## (undated) DEVICE — CANN NASL CO2 TRULINK W/O2 A/

## (undated) DEVICE — THE TORRENT IRRIGATION SCOPE CONNECTOR IS USED WITH THE TORRENT IRRIGATION TUBING TO PROVIDE IRRIGATION FLUIDS SUCH AS STERILE WATER DURING GASTROINTESTINAL ENDOSCOPIC PROCEDURES WHEN USED IN CONJUNCTION WITH AN IRRIGATION PUMP (OR ELECTROSURGICAL UNIT).: Brand: TORRENT

## (undated) DEVICE — Device: Brand: DEFENDO AIR/WATER/SUCTION AND BIOPSY VALVE

## (undated) DEVICE — TBG 02 CRUSH RESIST LF CLR 7FT